# Patient Record
(demographics unavailable — no encounter records)

---

## 2017-11-24 NOTE — HP
HISTORY OF PRESENT ILLNESS:  Ms. Wilder is a 79-year-old  woman 
treated by Dr. Mp Hammond, who is hospitalized for worsening/disabling 
vertigo that began approximately 1 month ago and was intermittent in nature, it 
is far more constant and more intense.  She reports no following, but has 
indicated that she must hold on to structures to keep her from falling.  She 
denies nausea with this and also has had no headache or head trauma.  She 
denies further any stroke-like symptoms to include weakness of an arm or leg, 
mental confusion or garbled speech.  History is taken from the patient herself.
  Her oldest son is at the bedside in previous health care documentation.  She 
had been seen on 11/11/2017 in the emergency room for this same type of problem 
but the symptoms have been somewhat abrupt in onset.  There is specifically 
described as a spinning sensation that is made worse with movement of her head.
  While in the emergency room, she had a CT scan of her head, which showed some 
chronic small vessel ischemic changes and a right-sided basal ganglia lacunar 
infarct.

 

PAST MEDICAL HISTORY:

1.  Chronic low back pain.

2.  Obesity.

3.  Degenerative joint disease, status post right knee replacement in 01/2014.

4.  Anxiety and depression treated by Dr. Pichardo at [facility].

5.  History of psychosis where she had what she describes as some paranoid 
delusions in about 05/2017 and was treated at the [facility].  She currently denies any hallucinations.

 

PAST SURGICAL HISTORY:

1.  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

2.  Cholecystectomy.

3.  Knee replacement.

4.  Cataract surgery in October 2017 and Cataract Surgery in November 2017.

 

DRUG SENSITIVITIES:  CODEINE causes nausea.

 

MEDICATIONS:  Seroquel 25 mg at bedtime, divalproex sodium 250 mg 1 in the 
morning, 2 in the evening.

 

HABITS:  No tobacco, alcohol or illicit drugs.

 

SOCIAL HISTORY:  The patient lives alone in one-story home in [place].  She has 4 
children.  Her Latter day preference is Bahai.

 

PHYSICAL EXAMINATION:

VITAL SIGNS:  Heart rate 77, blood pressure 134/60.  Other vital signs, see 
nursing notes.

GENERAL:  Alert, pleasant, in no acute distress, in a semi recumbent position, 
lying on hospital gur[place]idell in the emergency room.  Her breathing is nonlabored.  
Her mood is euthymic.  Her affect is normal range and intensity.  Her thought 
processes appear fairly normal.  Normal thought processes.  No hallucinations, 
delusions or flight of ideas.  Her speech is normal and is not garbled.

HEENT:  No conjunctivitis.  Oral cavity without erythema or exudate.  There are 
upper dentures present.

NECK:  Supple without lymphadenopathy or thyromegaly.

LUNGS:  Clear to auscultation without crackles or wheezes.

CARDIAC:  Regular rate and rhythm without murmur, gallop or rub.

ABDOMEN:  Soft, nontender.  It is protuberant.

SKIN:  With normal texture and appearance.

NEUROLOGIC:  She has 5/5 motor strength in her biceps, triceps, handgrips, 
plantar flexion and dorsiflexion.  She has normal finger to nose without past 
pointing.normal heel to shin bilaterally; nystagmus not overtly seen with 
testing of extraocular motility.

EXTREMITIES:  Without clubbing, cyanosis, or edema.

 

LABORATORY DATA:  White blood cell count 8.1, hemoglobin 15.2.  Chemistry panel 
is essentially normal.  TSH is normal.  Urinalysis is normal.

 

ASSESSMENT:

1.  Worsening and at least partially disabling vertigo and worsening over the 
last month.  The negative CT scan is somewhat reassuring the possibility of 
some sort of cerebellar insult that is not obvious on this examination in this 
possibility.

2.  History of anxiety and depression.  Appears to be stable at this time.

3.  History of psychotic problem, now appears to be stable.

 

PLAN:

1.  Hospitalization for observation.

2.  Consider MRI.

3.  Consider neurological opinion.

 

[facility]

## 2017-11-24 NOTE — CT
CT HEAD WITHOUT IV CONTRAST:

 

Date:  11/24/17 

 

HISTORY:  

Dizziness, which has gotten worse. 

 

COMPARISON:  

None available. 

 

FINDINGS:

There is a low density focus seen within the right basal ganglia, likely related to a remote lacunar 
infarction. There is no evidence of an acute cortical infarction or hemorrhage. There is decreased at
tenuation in the periventricular white matter, likely reflective of chronic small vessel ischemic francisco
nges. There is diffuse cerebral volume loss. The ventricular system is normal in size, shape, and pos
ition for the degree of sulcal atrophy. There is mucosal thickening seen in each sphenoid sinus. The 
mastoid air cells are clear. Calvarial structures are intact. 

 

IMPRESSION: 

1.  No acute intracranial abnormality is demonstrated.  

2.  Chronic small vessel ischemic changes and cerebral volume loss. 

3.  Findings likely attributable to remote lacunar infarction in the right basal ganglia. 

4.  Sinus disease involving each sphenoid sinus. 

 

POS: [facility]

## 2017-11-25 NOTE — MRI
BRAIN MRI WITHOUT CONTRAST

11/25/17

 

COMPARISON:  

None.

 

HISTORY: 

Lacunar infarction, vertigo.

 

TECHNIQUE:  

Multiplanar and multisequence MRI imaging of the brain obtained without contrast. 

 

FINDINGS:  

The diffusion weighted imaging demonstrates no evidence for acute infarction. 

 

Axial gradient echo imaging demonstrates no evidence for intracranial hemorrhage. 

 

There are a few scattered inferior opacified mastoid air cells on the left. There is mucosal thickeni
ng involving bilateral sphenoid sinuses and the left frontal sinus. 

 

There is moderate diffuse cerebral volume loss. Punctate foci of increased T2 and FLAIR signal noted 
in the periventricular white matter suggesting small vessel disease. Regional bone marrow signal inte
nsity is within normal limits. 

 

IMPRESSION:  

No acute infarction. 

 

POS: [facility]

## 2017-11-25 NOTE — PRG
DATE OF SERVICE:  11/25/2017

 

SUBJECTIVE:  Tina was hospitalized last evening with worsening and disabling vertigo and a basal g
anglia lacunar infarct was seen on the CT scan of her head.  She notes that her vertiginous symptoms 
are about the same and she has difficulty walking without assistance.  She does not trust herself to 
walk without assistance.  She denies any anxiety or depression symptoms.  She further denies any neumann
ucinations or dilutions, in particular no paranoid ideology.

 

OBJECTIVE:

VITAL SIGNS:  Blood pressure 134/69, temperature 97.8, pulse 77, respiratory rate 16, O2 saturation 9
5%.

GENERAL:  Alert, pleasant, in no acute distress, obese  woman with normal speech.  Mood and 
affect appear to be normal and normal thought processes.

LUNGS:  Clear to auscultation.

CARDIAC:  Regular rate and rhythm.

ABDOMEN:  Soft, nontender.

EXTREMITIES:  Patient able to sit and stand independently.  She will walk with me as long as I hold o
nto her right upper extremity.  She does not fall.  She is able to ambulate in what appears to be a n
ormal manner.

 

ASSESSMENT:

1.  Worsening and partially disabling vertigo.

2.  CT scan showing basal ganglia lacunar infarct and ischemic white matter changes.

3.  History of anxiety and depression, stable.

4.  History of paranoid delusions/psychosis, now appear to be stable.

 

PLAN:

1.  MRI of brain.

2.  Neurological opinion, PT.

## 2017-11-26 NOTE — DIS
HOSPITAL COURSE:  Jael is a 79-year-old  female treated by Dr. Mp Hammond, who has bee
n having some worsening progressive vertigo symptoms over the last few weeks.  She was seen in the em
ergency room and had a head CT that showed a basal ganglia lacunar infarct and she was noted to have 
some significant vertigo-type symptoms that impaired her ability to ambulate.  She was otherwise neur
ologically intact.  She subsequently had an MRI of her brain which did not reveal a mass or any acute
 infarction.  At the time of discharge, she indicates that she was able to walk from her bed to the [facility] facility independently and she also reported that the spinning sensation had resolved.

 

ASSESSMENT:

1.  Vertigo, improved during hospitalization.  Discussed with the patient the risk of fall and her me
dications can help this problem, but would make also at risk for the fall.  She indicates that she ha
s and does use a walker in the home setting.

2.  History of chronic low back pain.

3.  History of obesity.

4.  Degenerative joint disease.

5.  History of anxiety and depression, treated by Dr. Pichardo.

6.  History of psychosis with paranoid delusions in 05/2017.

 

PLAN:

1.  Discharge home today.

2.  Follow up with the [facility] for ongoing symptoms.